# Patient Record
Sex: FEMALE | Race: BLACK OR AFRICAN AMERICAN | NOT HISPANIC OR LATINO
[De-identification: names, ages, dates, MRNs, and addresses within clinical notes are randomized per-mention and may not be internally consistent; named-entity substitution may affect disease eponyms.]

---

## 2017-11-01 PROBLEM — Z00.00 ENCOUNTER FOR PREVENTIVE HEALTH EXAMINATION: Status: ACTIVE | Noted: 2017-11-01

## 2017-11-17 ENCOUNTER — APPOINTMENT (OUTPATIENT)
Dept: SURGERY | Facility: CLINIC | Age: 72
End: 2017-11-17
Payer: MEDICARE

## 2017-11-17 DIAGNOSIS — Z78.9 OTHER SPECIFIED HEALTH STATUS: ICD-10-CM

## 2017-11-17 PROCEDURE — 99203 OFFICE O/P NEW LOW 30 MIN: CPT

## 2017-12-26 ENCOUNTER — MOBILE ON CALL (OUTPATIENT)
Age: 72
End: 2017-12-26

## 2018-03-09 ENCOUNTER — APPOINTMENT (OUTPATIENT)
Dept: SURGERY | Facility: CLINIC | Age: 73
End: 2018-03-09
Payer: MEDICARE

## 2018-03-09 DIAGNOSIS — E11.9 TYPE 2 DIABETES MELLITUS W/OUT COMPLICATIONS: ICD-10-CM

## 2018-03-09 DIAGNOSIS — K80.71 CALCULUS OF GALLBLADDER AND BILE DUCT W/OUT CHOLECYSTITIS WITH OBSTRUCTION: ICD-10-CM

## 2018-03-09 DIAGNOSIS — K81.1 CHRONIC CHOLECYSTITIS: ICD-10-CM

## 2018-03-09 PROCEDURE — 99213 OFFICE O/P EST LOW 20 MIN: CPT

## 2018-05-10 ENCOUNTER — APPOINTMENT (OUTPATIENT)
Dept: VASCULAR SURGERY | Facility: CLINIC | Age: 73
End: 2018-05-10

## 2018-05-15 PROBLEM — K80.71 CALCULUS OF GALLBLADDER AND BILE DUCT WITH OBSTRUCTION WITHOUT CHOLECYSTITIS: Status: ACTIVE | Noted: 2017-11-17

## 2018-05-15 PROBLEM — K81.1 CHRONIC CHOLECYSTITIS: Status: ACTIVE | Noted: 2017-11-17

## 2018-05-15 PROBLEM — E11.9 DIABETES: Status: ACTIVE | Noted: 2017-11-17

## 2023-03-12 ENCOUNTER — TRANSCRIPTION ENCOUNTER (OUTPATIENT)
Age: 78
End: 2023-03-12

## 2023-03-13 ENCOUNTER — TRANSCRIPTION ENCOUNTER (OUTPATIENT)
Age: 78
End: 2023-03-13

## 2023-03-14 ENCOUNTER — TRANSCRIPTION ENCOUNTER (OUTPATIENT)
Age: 78
End: 2023-03-14

## 2023-04-03 ENCOUNTER — APPOINTMENT (OUTPATIENT)
Dept: GASTROENTEROLOGY | Facility: CLINIC | Age: 78
End: 2023-04-03

## 2023-04-04 ENCOUNTER — NON-APPOINTMENT (OUTPATIENT)
Age: 78
End: 2023-04-04

## 2023-04-04 DIAGNOSIS — K52.9 NONINFECTIVE GASTROENTERITIS AND COLITIS, UNSPECIFIED: ICD-10-CM

## 2023-04-04 DIAGNOSIS — N82.3 FISTULA OF VAGINA TO LARGE INTESTINE: ICD-10-CM

## 2023-04-04 RX ORDER — LISINOPRIL 20 MG/1
20 TABLET ORAL
Qty: 90 | Refills: 0 | Status: ACTIVE | COMMUNITY
Start: 2023-01-12

## 2023-04-04 RX ORDER — TRIAMCINOLONE ACETONIDE 1 MG/G
0.1 CREAM TOPICAL
Qty: 160 | Refills: 0 | Status: ACTIVE | COMMUNITY
Start: 2022-10-07

## 2023-04-04 RX ORDER — AMLODIPINE BESYLATE 10 MG/1
10 TABLET ORAL
Qty: 90 | Refills: 0 | Status: ACTIVE | COMMUNITY
Start: 2023-03-02

## 2023-04-04 RX ORDER — QUINAPRIL HYDROCHLORIDE 40 MG/1
40 TABLET, FILM COATED ORAL
Qty: 90 | Refills: 0 | Status: ACTIVE | COMMUNITY
Start: 2022-01-12

## 2023-04-04 RX ORDER — MELOXICAM 15 MG/1
15 TABLET ORAL
Qty: 30 | Refills: 0 | Status: ACTIVE | COMMUNITY
Start: 2022-08-11

## 2023-04-04 RX ORDER — MUPIROCIN 20 MG/G
2 OINTMENT TOPICAL
Qty: 22 | Refills: 0 | Status: ACTIVE | COMMUNITY
Start: 2022-10-06

## 2023-04-04 RX ORDER — PANTOPRAZOLE 40 MG/1
40 TABLET, DELAYED RELEASE ORAL
Qty: 90 | Refills: 0 | Status: ACTIVE | COMMUNITY
Start: 2022-04-25

## 2023-04-04 RX ORDER — PETROLATUM,WHITE 41 %
OINTMENT (GRAM) TOPICAL
Qty: 85 | Refills: 0 | Status: ACTIVE | COMMUNITY
Start: 2023-03-16

## 2023-04-04 RX ORDER — APIXABAN 2.5 MG/1
2.5 TABLET, FILM COATED ORAL
Qty: 60 | Refills: 0 | Status: ACTIVE | COMMUNITY
Start: 2023-03-15

## 2023-04-04 RX ORDER — SIMVASTATIN 20 MG/1
20 TABLET, FILM COATED ORAL
Qty: 90 | Refills: 0 | Status: ACTIVE | COMMUNITY
Start: 2023-02-02

## 2023-04-04 RX ORDER — GABAPENTIN 100 MG/1
100 CAPSULE ORAL
Qty: 90 | Refills: 0 | Status: ACTIVE | COMMUNITY
Start: 2023-02-02

## 2023-04-04 RX ORDER — METOPROLOL TARTRATE 50 MG/1
50 TABLET, FILM COATED ORAL
Qty: 90 | Refills: 0 | Status: ACTIVE | COMMUNITY
Start: 2022-07-18

## 2023-04-04 RX ORDER — SILVER SULFADIAZINE 10 MG/G
1 CREAM TOPICAL
Qty: 50 | Refills: 0 | Status: ACTIVE | COMMUNITY
Start: 2023-02-09

## 2023-04-05 ENCOUNTER — RESULT REVIEW (OUTPATIENT)
Age: 78
End: 2023-04-05

## 2023-04-05 ENCOUNTER — APPOINTMENT (OUTPATIENT)
Dept: HEMATOLOGY ONCOLOGY | Facility: CLINIC | Age: 78
End: 2023-04-05
Payer: COMMERCIAL

## 2023-04-05 VITALS
HEART RATE: 82 BPM | OXYGEN SATURATION: 96 % | SYSTOLIC BLOOD PRESSURE: 137 MMHG | TEMPERATURE: 96.7 F | RESPIRATION RATE: 16 BRPM | DIASTOLIC BLOOD PRESSURE: 86 MMHG

## 2023-04-05 DIAGNOSIS — I10 ESSENTIAL (PRIMARY) HYPERTENSION: ICD-10-CM

## 2023-04-05 DIAGNOSIS — E04.1 NONTOXIC SINGLE THYROID NODULE: ICD-10-CM

## 2023-04-05 PROCEDURE — 99214 OFFICE O/P EST MOD 30 MIN: CPT | Mod: 25

## 2023-04-05 PROCEDURE — 36415 COLL VENOUS BLD VENIPUNCTURE: CPT

## 2023-04-05 PROCEDURE — 99072 ADDL SUPL MATRL&STAF TM PHE: CPT

## 2023-04-06 NOTE — ASSESSMENT
[FreeTextEntry1] : 77 year old female with second episode of left lower leg DVT.  She presented to [facility] on 3/10/23 for GI bleeding.  During her hospital admission she was found to have a left DVT, vascular was consulted and she had an IVC filter placed on 3/11/23 with Dr. Garcia.  She received PRBC's/venofer and ultimately had colonoscopy and EGD with Dr. Casper on 3/12/23 with no significant findings/ active bleeding. Hemoglobin upon discharge was 8.8, ferritin of 64 and normal Iron saturation. GI cleared her to start Eliquis 2.5 mg BID (lower dose given risk of bleeding)  \par \par #DVT- second episode no evidence of bleeding. Given 2nd episode of DVT will continue eliquis 2.5mg due to bleeding history. To follow up with vascular for IVC filter.\par \par #anemia- check anemia panel today- Improvement in hgb to 10.8.  Obtain records from PCP Dr. Niko Patel to find out baseline H/H.\par \par #thyroid nodule- Found on CT neck 2.7 x 1.9cm nodule associated with the thyroid isthmus- F/u with thyroid US\par \par \par PCP- Dr. Niko Patel \par \par Case and mgmt discussed with Dr. Santillan

## 2023-04-06 NOTE — HISTORY OF PRESENT ILLNESS
[de-identified] : 77 year old female with a PMH of CVA, DM, HTN, CKD, and DVT who was on anticoagulation.  She presented to [facility] on 3/10/23 for GI bleeding.  During her hospital admission she was found to have a left DVT, vascular was consulted and she had an IVC filter placed on 3/11/23 with Dr. Garcia.  She received PRBC's/venofer and ultimately had colonoscopy and EGD with Dr. Casper on 3/12/23 with no significant findings/ active bleeding. Hemoglobin upon discharge was 8.8, ferritin of 64 and normal Iron saturation. GI cleared her to start Eliquis 2.5 mg BID (lower dose given risk of bleeding)   \par \par EGD- Erythema and granularity in the antrum\par Colonoscopy- No active bleeding, old blood seen in sigmoid colon, Diverticuloses of moderate severity and non bleeding diverticula were seen in the sigmoid colon. Multiple divers washed out.  Grade/Stage II internal hemorrhoids- She was started on ansol HC suppositories q 12 hrs\par \par 3/10/23, Doppler- Positive DVT involving the left proximal femoral vein to the left popliteal vein\par 3/11/23, CT abdomen and pelvis- No active bleeding. Benign right sided adenoma- no change \par 3/10/23, CT neck- : No significant change in multinodular thyroid gland. Recommend follow-up thyroid ultrasound as there is a 2.7 x 1.9 cm nodule associated with the thyroid isthmus. Also demonstrated is evidence of chronic right parotitis. There is no right parotid gland enlargement or surrounding edema to suggest acute inflammatory process.\par Recommend correlation with location of clinical symptoms.

## 2023-04-19 ENCOUNTER — RESULT REVIEW (OUTPATIENT)
Age: 78
End: 2023-04-19

## 2023-05-02 ENCOUNTER — NON-APPOINTMENT (OUTPATIENT)
Age: 78
End: 2023-05-02

## 2023-05-02 ENCOUNTER — APPOINTMENT (OUTPATIENT)
Dept: GASTROENTEROLOGY | Facility: CLINIC | Age: 78
End: 2023-05-02

## 2023-05-02 ENCOUNTER — APPOINTMENT (OUTPATIENT)
Dept: VASCULAR SURGERY | Facility: CLINIC | Age: 78
End: 2023-05-02
Payer: COMMERCIAL

## 2023-05-02 VITALS — SYSTOLIC BLOOD PRESSURE: 148 MMHG | HEIGHT: 66 IN | HEART RATE: 80 BPM | DIASTOLIC BLOOD PRESSURE: 94 MMHG

## 2023-05-02 PROCEDURE — 99213 OFFICE O/P EST LOW 20 MIN: CPT

## 2023-05-02 PROCEDURE — 99072 ADDL SUPL MATRL&STAF TM PHE: CPT

## 2023-05-02 NOTE — HISTORY OF PRESENT ILLNESS
[FreeTextEntry1] : 77 year-old female s/p IVC filter placement on 3/13/23 for acute lower extremity femoral-popliteal DVT and inability to be anticoagulated due to GI bleed.  During that hospitalization patient tolerated IVC filter well and had no issues postprocedure.  She underwent full GI work-up which was unremarkable.  Patient is followed by hematology. The patient continues to take Eliquis 2.5 mg BID with no active bleeding however she is unable to tolerate full PCP.

## 2023-05-02 NOTE — PHYSICAL EXAM
[Normal Breath Sounds] : Normal breath sounds [Alert] : alert [Oriented to Person] : oriented to person [Oriented to Place] : oriented to place [Oriented to Time] : oriented to time [Calm] : calm [1+] : left 1+ [Ankle Swelling (On Exam)] : present [Ankle Swelling Bilaterally] : bilaterally  [Ankle Swelling On The Left] : moderate [JVD] : no jugular venous distention  [2+] : left 2+ [de-identified] : NAD. Awake and Alert. [de-identified] : NCAT. Extraocular muscles in tact. [de-identified] : Soft, NT, ND. No guarding. No palpable masses. No HSM. [de-identified] : No CVA tenderness. [de-identified] : Normal muscle bulk and tone.  [de-identified] : AAOx3, CN II-XII intact. Sensation intact throughout.  Left upper and lower extremity weakness due to CVA. [de-identified] : Appropriate affect.

## 2023-05-02 NOTE — ASSESSMENT
[FreeTextEntry1] : 78 yo female with a history of DVT with inability to tolerate anticoagulation. She is now s/p an IVC filter placement approximately 3 weeks ago.  No complications of IVC filter and.  Patient tolerated the and recovered from the procedure well.  Continue Eliquis as prescribed by hematology.  Advance to full dose Eliquis as tolerated.\par \par Office in 12 months or sooner as needed.\par \par 22 minutes

## 2023-05-09 ENCOUNTER — RESULT REVIEW (OUTPATIENT)
Age: 78
End: 2023-05-09

## 2023-05-10 ENCOUNTER — RESULT REVIEW (OUTPATIENT)
Age: 78
End: 2023-05-10

## 2023-05-11 ENCOUNTER — APPOINTMENT (OUTPATIENT)
Dept: HEMATOLOGY ONCOLOGY | Facility: CLINIC | Age: 78
End: 2023-05-11

## 2023-05-11 ENCOUNTER — RESULT REVIEW (OUTPATIENT)
Age: 78
End: 2023-05-11

## 2023-05-11 ENCOUNTER — APPOINTMENT (OUTPATIENT)
Dept: HEMATOLOGY ONCOLOGY | Facility: CLINIC | Age: 78
End: 2023-05-11
Payer: COMMERCIAL

## 2023-05-11 VITALS
TEMPERATURE: 96.5 F | OXYGEN SATURATION: 92 % | DIASTOLIC BLOOD PRESSURE: 87 MMHG | HEART RATE: 83 BPM | RESPIRATION RATE: 18 BRPM | SYSTOLIC BLOOD PRESSURE: 156 MMHG | HEIGHT: 66 IN

## 2023-05-11 DIAGNOSIS — I82.409 ACUTE EMBOLISM AND THROMBOSIS OF UNSPECIFIED DEEP VEINS OF UNSPECIFIED LOWER EXTREMITY: ICD-10-CM

## 2023-05-11 DIAGNOSIS — I63.9 CEREBRAL INFARCTION, UNSPECIFIED: ICD-10-CM

## 2023-05-11 DIAGNOSIS — D64.9 ANEMIA, UNSPECIFIED: ICD-10-CM

## 2023-05-11 PROCEDURE — 99214 OFFICE O/P EST MOD 30 MIN: CPT | Mod: 25

## 2023-05-11 PROCEDURE — 36415 COLL VENOUS BLD VENIPUNCTURE: CPT

## 2023-05-11 PROCEDURE — 99072 ADDL SUPL MATRL&STAF TM PHE: CPT

## 2023-05-11 RX ORDER — APIXABAN 5 MG/1
5 TABLET, FILM COATED ORAL
Qty: 180 | Refills: 2 | Status: ACTIVE | COMMUNITY
Start: 2022-08-08

## 2023-05-18 PROBLEM — I82.409 DVT (DEEP VENOUS THROMBOSIS): Status: ACTIVE | Noted: 2023-04-04

## 2023-05-18 PROBLEM — I63.9 CVA (CEREBRAL VASCULAR ACCIDENT): Status: ACTIVE | Noted: 2023-04-04

## 2023-05-18 PROBLEM — D64.9 ANEMIA: Status: ACTIVE | Noted: 2023-04-04

## 2023-05-18 NOTE — HISTORY OF PRESENT ILLNESS
[de-identified] : 77 year old female with a PMH of CVA, DM, HTN, CKD, and DVT who was on anticoagulation.  She presented to [facility] on 3/10/23 for GI bleeding.  During her hospital admission she was found to have a left DVT, vascular was consulted and she had an IVC filter placed on 3/11/23 with Dr. Garcia.  She received PRBC's/venofer and ultimately had colonoscopy and EGD with Dr. Casper on 3/12/23 with no significant findings/ active bleeding. Hemoglobin upon discharge was 8.8, ferritin of 64 and normal Iron saturation. GI cleared her to start Eliquis 2.5 mg BID (lower dose given risk of bleeding)   \par \par EGD- Erythema and granularity in the antrum\par Colonoscopy- No active bleeding, old blood seen in sigmoid colon, Diverticuloses of moderate severity and non bleeding diverticula were seen in the sigmoid colon. Multiple divers washed out.  Grade/Stage II internal hemorrhoids- She was started on ansol HC suppositories q 12 hrs\par \par 3/10/23, Doppler- Positive DVT involving the left proximal femoral vein to the left popliteal vein\par 3/11/23, CT abdomen and pelvis- No active bleeding. Benign right sided adenoma- no change \par 3/10/23, CT neck- : No significant change in multinodular thyroid gland. Recommend follow-up thyroid ultrasound as there is a 2.7 x 1.9 cm nodule associated with the thyroid isthmus. Also demonstrated is evidence of chronic right parotitis. There is no right parotid gland enlargement or surrounding edema to suggest acute inflammatory process.\par Recommend correlation with location of clinical symptoms. [de-identified] : Patient is here for follow up for DVT. Patient was out for 4 hours yesterday. She is currently having a lot of pain in the left upper thigh. She had an ultrasound guided biopsy of the thyroid.

## 2023-05-18 NOTE — ASSESSMENT
[FreeTextEntry1] : 77 year old female with second episode of left lower leg DVT.  She presented to [facility] on 3/10/23 for GI bleeding.  During her hospital admission she was found to have a left DVT, vascular was consulted and she had an IVC filter placed on 3/11/23 with Dr. Garcia.  She received PRBC's/venofer and ultimately had colonoscopy and EGD with Dr. Casper on 3/12/23 with no significant findings/ active bleeding. Hemoglobin upon discharge was 8.8, ferritin of 64 and normal Iron saturation. GI cleared her to start Eliquis 2.5 mg BID (lower dose given risk of bleeding)  \par \par #DVT- second episode no evidence of bleeding. Given 2nd episode of DVT will continue eliquis 2.5mg due to bleeding history. To follow up with vascular for IVC filter.  Decline in ferritin to 53.  Schedule for IV iron\par \par #anemia- check anemia panel today- Improvement in hgb to 12.5. obtain records from PCP Dr. Niko Patel to find out baseline H/H.\par \par #thyroid nodule- Found on CT neck 2.7 x 1.9cm nodule associated with the thyroid isthmus-patient underwent thyroid biopsy.  Results showed follicular lesion of undetermined significance Davis category 3.  ThyroSeq send out.\par \par PCP- Dr. Niko Patel \par \par

## 2023-05-18 NOTE — REVIEW OF SYSTEMS
[Joint Pain] : joint pain [Muscle Pain] : muscle pain [Negative] : Allergic/Immunologic [FreeTextEntry9] : left leg pain

## 2023-06-13 ENCOUNTER — APPOINTMENT (OUTPATIENT)
Dept: GASTROENTEROLOGY | Facility: CLINIC | Age: 78
End: 2023-06-13
Payer: COMMERCIAL

## 2023-06-13 VITALS
RESPIRATION RATE: 18 BRPM | OXYGEN SATURATION: 98 % | SYSTOLIC BLOOD PRESSURE: 146 MMHG | HEIGHT: 66 IN | DIASTOLIC BLOOD PRESSURE: 68 MMHG | HEART RATE: 88 BPM

## 2023-06-13 DIAGNOSIS — R15.9 FULL INCONTINENCE OF FECES: ICD-10-CM

## 2023-06-13 PROCEDURE — 99214 OFFICE O/P EST MOD 30 MIN: CPT

## 2023-06-13 NOTE — PHYSICAL EXAM
[Alert] : alert [Well Developed] : well developed [Well Nourished] : well nourished [Normal] : oriented to person, place, and time

## 2023-06-13 NOTE — HISTORY OF PRESENT ILLNESS
[FreeTextEntry1] : Ms. Gamboa is a pleasant 77F h/o CVA, DM, HTN, CKD, DVT on AC who was admitted from 3/10 - 3/15/23 for GI bleeding.\par \par She underwent an EGD/colonoscopy with Dr. Casper on 3/12, saw erythema and granularity in the antrum, pan-colonic diverticulosis, int hemorrhoids. Thought that her bleeding was a resolved diverticular bleed.\par \par She has since had labs drawn showing that her H/H is at baseline. Has not had any recurrent rectal bleeding.  Stool is dark brown, takes iron supplements.\par \par Main complaint is mild fecal incontinence.  Has at least 1 or more BMs daily, soft but formed dark brown stool, frequently passes small amounts of stool in her underwear. Has not used anything for this.

## 2023-06-13 NOTE — ASSESSMENT
[FreeTextEntry1] : Continue low dose AC.\par \par Will give a trial of fiber supplements, such as Metamucil or Benefiber, one dose daily in an attempt to bulk up and soften stool.\par 
runny nose or nasal congestion

## 2023-07-03 ENCOUNTER — APPOINTMENT (OUTPATIENT)
Dept: HEMATOLOGY ONCOLOGY | Facility: CLINIC | Age: 78
End: 2023-07-03

## 2023-09-13 ENCOUNTER — TRANSCRIPTION ENCOUNTER (OUTPATIENT)
Age: 78
End: 2023-09-13

## 2023-10-05 ENCOUNTER — APPOINTMENT (OUTPATIENT)
Dept: GASTROENTEROLOGY | Facility: CLINIC | Age: 78
End: 2023-10-05

## 2023-10-12 ENCOUNTER — APPOINTMENT (OUTPATIENT)
Dept: GASTROENTEROLOGY | Facility: CLINIC | Age: 78
End: 2023-10-12

## 2023-11-28 ENCOUNTER — APPOINTMENT (OUTPATIENT)
Dept: GASTROENTEROLOGY | Facility: CLINIC | Age: 78
End: 2023-11-28
Payer: COMMERCIAL

## 2023-11-28 VITALS
HEART RATE: 63 BPM | HEIGHT: 66 IN | SYSTOLIC BLOOD PRESSURE: 118 MMHG | OXYGEN SATURATION: 98 % | DIASTOLIC BLOOD PRESSURE: 80 MMHG

## 2023-11-28 DIAGNOSIS — K92.2 GASTROINTESTINAL HEMORRHAGE, UNSPECIFIED: ICD-10-CM

## 2023-11-28 PROCEDURE — 99213 OFFICE O/P EST LOW 20 MIN: CPT

## 2023-11-29 PROBLEM — K92.2 GI BLEED: Status: ACTIVE | Noted: 2023-06-13

## 2024-05-29 ENCOUNTER — APPOINTMENT (OUTPATIENT)
Dept: GASTROENTEROLOGY | Facility: HOSPITAL | Age: 79
End: 2024-05-29

## 2024-05-29 ENCOUNTER — TRANSCRIPTION ENCOUNTER (OUTPATIENT)
Age: 79
End: 2024-05-29

## 2024-06-03 ENCOUNTER — TRANSCRIPTION ENCOUNTER (OUTPATIENT)
Age: 79
End: 2024-06-03

## 2024-06-11 ENCOUNTER — OFFICE (OUTPATIENT)
Dept: URBAN - METROPOLITAN AREA CLINIC 29 | Facility: CLINIC | Age: 79
Setting detail: OPHTHALMOLOGY
End: 2024-06-11
Payer: COMMERCIAL

## 2024-06-11 DIAGNOSIS — H25.13: ICD-10-CM

## 2024-06-11 DIAGNOSIS — H16.223: ICD-10-CM

## 2024-06-11 PROCEDURE — 92014 COMPRE OPH EXAM EST PT 1/>: CPT | Performed by: OPTOMETRIST

## 2024-06-11 ASSESSMENT — CONFRONTATIONAL VISUAL FIELD TEST (CVF)
OD_FINDINGS: FULL
OS_FINDINGS: FULL

## 2025-05-29 ENCOUNTER — RESULT REVIEW (OUTPATIENT)
Age: 80
End: 2025-05-29

## 2025-05-29 ENCOUNTER — TRANSCRIPTION ENCOUNTER (OUTPATIENT)
Age: 80
End: 2025-05-29

## 2025-05-30 ENCOUNTER — TRANSCRIPTION ENCOUNTER (OUTPATIENT)
Age: 80
End: 2025-05-30

## 2025-07-08 ENCOUNTER — OFFICE (OUTPATIENT)
Dept: URBAN - METROPOLITAN AREA CLINIC 29 | Facility: CLINIC | Age: 80
Setting detail: OPHTHALMOLOGY
End: 2025-07-08
Payer: COMMERCIAL

## 2025-07-08 DIAGNOSIS — H16.223: ICD-10-CM

## 2025-07-08 DIAGNOSIS — H25.13: ICD-10-CM

## 2025-07-08 PROCEDURE — 92014 COMPRE OPH EXAM EST PT 1/>: CPT | Performed by: OPTOMETRIST

## 2025-07-08 ASSESSMENT — REFRACTION_MANIFEST
OS_ADD: +2.00
OD_VA1: 20/40
OS_ADD: +2.00
OS_SPHERE: +1.25
OD_SPHERE: +1.25
OS_SPHERE: +1.25
OD_VA1: 20/40
OD_SPHERE: +1.25
OD_ADD: +2.00
OD_CYLINDER: SPHERE
OS_VA1: 20/40-2
OS_VA1: 20/40-2
OD_CYLINDER: SPHERE
OD_ADD: +2.00
OS_CYLINDER: SPHERE
OS_CYLINDER: SPHERE

## 2025-07-08 ASSESSMENT — REFRACTION_CURRENTRX
OD_OVR_VA: 20/
OD_SPHERE: +2.00
OS_SPHERE: +2.00
OS_OVR_VA: 20/

## 2025-07-08 ASSESSMENT — CONFRONTATIONAL VISUAL FIELD TEST (CVF)
OD_FINDINGS: FULL
OS_FINDINGS: FULL

## 2025-07-08 ASSESSMENT — REFRACTION_AUTOREFRACTION
OS_AXIS: 095
OD_SPHERE: +1.25
OS_SPHERE: +1.75
OD_CYLINDER: +1.25
OS_CYLINDER: +0.50
OD_AXIS: 085

## 2025-07-08 ASSESSMENT — VISUAL ACUITY
OD_BCVA: 20/40-1
OS_BCVA: 20/50-1

## 2025-07-08 ASSESSMENT — TEAR BREAK UP TIME (TBUT)
OD_TBUT: T
OS_TBUT: T

## 2025-07-21 ENCOUNTER — APPOINTMENT (OUTPATIENT)
Dept: GASTROENTEROLOGY | Facility: CLINIC | Age: 80
End: 2025-07-21
Payer: COMMERCIAL

## 2025-07-21 VITALS — HEIGHT: 66 IN | DIASTOLIC BLOOD PRESSURE: 80 MMHG | SYSTOLIC BLOOD PRESSURE: 110 MMHG

## 2025-07-21 DIAGNOSIS — K92.2 GASTROINTESTINAL HEMORRHAGE, UNSPECIFIED: ICD-10-CM

## 2025-07-21 PROCEDURE — 99213 OFFICE O/P EST LOW 20 MIN: CPT
